# Patient Record
Sex: FEMALE | Race: WHITE | Employment: FULL TIME | URBAN - METROPOLITAN AREA
[De-identification: names, ages, dates, MRNs, and addresses within clinical notes are randomized per-mention and may not be internally consistent; named-entity substitution may affect disease eponyms.]

---

## 2021-12-20 ENCOUNTER — HOSPITAL ENCOUNTER (EMERGENCY)
Age: 51
Discharge: HOME | End: 2021-12-20
Payer: COMMERCIAL

## 2021-12-20 VITALS
WEIGHT: 190 LBS | TEMPERATURE: 98.4 F | HEART RATE: 72 BPM | BODY MASS INDEX: 31.65 KG/M2 | OXYGEN SATURATION: 99 % | HEIGHT: 65 IN | SYSTOLIC BLOOD PRESSURE: 120 MMHG | DIASTOLIC BLOOD PRESSURE: 78 MMHG | RESPIRATION RATE: 18 BRPM

## 2021-12-20 DIAGNOSIS — R11.2 NON-INTRACTABLE VOMITING WITH NAUSEA, UNSPECIFIED VOMITING TYPE: Primary | ICD-10-CM

## 2021-12-20 PROCEDURE — 6370000000 HC RX 637 (ALT 250 FOR IP): Performed by: NURSE PRACTITIONER

## 2021-12-20 PROCEDURE — 99283 EMERGENCY DEPT VISIT LOW MDM: CPT

## 2021-12-20 RX ORDER — LANSOPRAZOLE 15 MG/1
15 CAPSULE, DELAYED RELEASE ORAL DAILY
Qty: 30 CAPSULE | Refills: 3 | Status: ON HOLD | OUTPATIENT
Start: 2021-12-20 | End: 2022-07-21 | Stop reason: ALTCHOICE

## 2021-12-20 RX ORDER — FAMOTIDINE 20 MG/1
20 TABLET, FILM COATED ORAL ONCE
Status: COMPLETED | OUTPATIENT
Start: 2021-12-20 | End: 2021-12-20

## 2021-12-20 RX ADMIN — FAMOTIDINE 20 MG: 20 TABLET ORAL at 17:01

## 2021-12-20 RX ADMIN — Medication: at 17:01

## 2021-12-20 ASSESSMENT — PAIN DESCRIPTION - FREQUENCY: FREQUENCY: CONTINUOUS

## 2021-12-20 ASSESSMENT — PAIN DESCRIPTION - PAIN TYPE: TYPE: ACUTE PAIN

## 2021-12-20 ASSESSMENT — PAIN SCALES - GENERAL: PAINLEVEL_OUTOF10: 4

## 2021-12-20 ASSESSMENT — ENCOUNTER SYMPTOMS
SORE THROAT: 0
RHINORRHEA: 0
SHORTNESS OF BREATH: 0
ABDOMINAL PAIN: 0
COLOR CHANGE: 0

## 2021-12-20 ASSESSMENT — PAIN DESCRIPTION - ONSET: ONSET: GRADUAL

## 2021-12-20 ASSESSMENT — PAIN DESCRIPTION - LOCATION: LOCATION: CHEST

## 2021-12-20 ASSESSMENT — PAIN DESCRIPTION - DESCRIPTORS: DESCRIPTORS: PRESSURE;BURNING

## 2021-12-20 NOTE — ED PROVIDER NOTES
Evaluated by [id number] [facility] Provider          [place] [facility] ED  EMERGENCY DEPARTMENT ENCOUNTER        Pt Name: Carlos Cortez  MRN: [id number]  Johnnygfshadia 1970  Dateof evaluation: 12/20/2021  Provider: PROSPER Gil CNP  PCP: PROSPER Yu CNP  ED Attending: No att. providers found    [address] [facility]       Chief Complaint   Patient presents with    Other     pt states she has food stuck in upper chest. pt states this happens all the time but normally someone can help her get it up. HISTORY OF PRESENTILLNESS   (Location/Symptom, Timing/Onset, Context/Setting, Quality, Duration, Modifying Factors, Severity)  Note limiting factors. Carlos Cortez is a 49 y.o. female for concern for esophageal foreign body. Onset was today. Context includes patient reports that he was eating steak when a piece became lodged. Patient reports that she was not able to drink anything and was brought to the ED. Patient reports that this is happened in the past however the food typically becomes dislodged and passes. She reports that the sensation has not been resolved and she is not been able to drink this time alleviating factors include nothing. Aggravating factors include nothing. Pain is 4/10. Nothing has been used for pain today. Nursing Notes were all reviewed and agreed with or any disagreements were addressed  in the HPI. REVIEW OF SYSTEMS    (2-9 systems for level 4, 10 or more for level 5)     Review of Systems   Constitutional: Negative for fever. HENT: Negative for congestion, rhinorrhea and sore throat. Respiratory: Negative for shortness of breath. Cardiovascular: Negative for chest pain. Gastrointestinal: Negative for abdominal pain. Concern for an esophageal foreign body   Genitourinary: Negative for decreased urine volume and difficulty urinating. Musculoskeletal: Negative for arthralgias and myalgias. Skin: Negative for color change and rash. Neurological: Negative for dizziness and light-headedness. Psychiatric/Behavioral: Negative for agitation. All other systems reviewed and are negative. Positives and Pertinent negatives as per HPI. Except as noted above in the ROS, all other systems were reviewed and negative. PAST MEDICAL HISTORY     Past Medical History:   Diagnosis Date    Diabetes mellitus ([address]     Hypertension          SURGICAL HISTORY     History reviewed. No pertinent surgical history. CURRENT MEDICATIONS       Discharge Medication List as of 12/20/2021  4:58 PM            ALLERGIES     Pcn [penicillins]    FAMILY HISTORY     History reviewed. No pertinent family history. SOCIAL HISTORY       Social History     Socioeconomic History    Marital status:      Spouse name: None    Number of children: None    Years of education: None    Highest education level: None   Occupational History    None   Tobacco Use    Smoking status: Never Smoker    Smokeless tobacco: Never Used   Substance and Sexual Activity    Alcohol use: None    Drug use: Never    Sexual activity: None   Other Topics Concern    None   Social History Narrative    None     Social Determinants of Health     Financial Resource Strain:     Difficulty of Paying Living Expenses: Not on file   Food Insecurity:     Worried About Running Out of Food in the Last Year: Not on file    [place] of Food in the Last Year: Not on file   Transportation Needs:     Lack of Transportation (Medical): Not on file    Lack of Transportation (Non-Medical):  Not on file   Physical Activity:     Days of Exercise per Week: Not on file    Minutes of Exercise per Session: Not on file   Stress:     Feeling of Stress : Not on file   Social Connections:     Frequency of Communication with Friends and Family: Not on file    Frequency of Social Gatherings with Friends and Family: Not on file    Attends Muslim Services: Not on file   [employer] of Clubs or Organizations: Not on file    Attends Club or Organization Meetings: Not on file    Marital Status: Not on file   Intimate Partner Violence:     Fear of Current or Ex-Partner: Not on file    Emotionally Abused: Not on file    Physically Abused: Not on file    Sexually Abused: Not on file   Housing Stability:     Unable to Pay for Housing in the Last Year: Not on file    Number of [place] in the Last Year: Not on file    Unstable Housing in the Last Year: Not on file       SCREENINGS             PHYSICAL EXAM  (up to 7 for level 4, 8 or more for level 5)     ED Triage Vitals [12/20/21 1626]   BP Temp Temp Source Pulse Resp SpO2 Height Weight   127/77 98.4 °F (36.9 °C) Tympanic 71 16 100 % 5' 5\" (1.651 m) 190 lb (86.2 kg)       Physical Exam  Constitutional:       Appearance: She is well-developed. HENT:      Head: Normocephalic and atraumatic. Cardiovascular:      Rate and Rhythm: Normal rate. Pulmonary:      Effort: Pulmonary effort is normal. No respiratory distress. Abdominal:      General: There is no distension. Palpations: Abdomen is soft. Tenderness: There is no abdominal tenderness. Musculoskeletal:         General: Normal range of motion. Cervical back: Normal range of motion. Skin:     General: Skin is warm and dry. Neurological:      Mental Status: She is alert and oriented to person, place, and time. DIAGNOSTIC RESULTS   LABS:    Labs Reviewed - No data to display    All other labs werewithin normal range or not returned as of this dictation. EKG: All EKG's are interpreted by the Emergency Department Physician who either signs or Co-signs this chart in the absence of a cardiologist.  Please see their note for interpretation of EKG. RADIOLOGY:           Interpretation per the Radiologist below, if available at the time of this note:    No orders to display     Patient was never admitted.       PROCEDURES   Unless otherwise noted below, none     Procedures     CRITICAL CARE TIME   N/A    CONSULTS:  None      EMERGENCYDEPARTMENT COURSE and DIFFERENTIAL DIAGNOSIS/MDM:   Vitals:    Vitals:    12/20/21 1626 12/20/21 1704   BP: 127/77 120/78   Pulse: 71 72   Resp: 16 18   Temp: 98.4 °F (36.9 °C)    TempSrc: Tympanic    SpO2: 100% 99%   Weight: 190 lb (86.2 kg)    Height: 5' 5\" (1.651 m)        Patient was given the following medications:  Medications   aluminum & magnesium hydroxide-simethicone (MAALOX) 30 mL, lidocaine viscous hcl (XYLOCAINE) 5 mL (GI COCKTAIL) ( Oral Given 12/20/21 1701)   famotidine (PEPCID) tablet 20 mg (20 mg Oral Given 12/20/21 1701)       Patient was seen and evaluated by myself. Patient here for concerns for esophageal foreign body. Patient reports that she has had this sensation in the past however typically resolves. Patient states that today the symptoms have not resolved. She reports that she is not been able to drink anything. On exam she is awake and alert hemodynamically stable nontoxic in appearance. At the time of my exam the patient states that she thinks her symptoms have improved. She was given a glass of water and was able to drink without any difficulty. At this point the patient will be discharged with instructions to follow-up with a GI doctor. She was encouraged to follow-up with her primary care doctor as well and return to the ED for any worsening symptoms. Patient was provided with GI cocktail and Pepcid. Patient will be discharged with a prescription for Prevacid. She was given GI referral and encouraged to follow-up. Patient was ultimately discharged with all questions answered. The patient tolerated their visit well. I have evaluated this patient. My supervising physician was available for consultation. The patient and / or the family were informed of the results of any tests, a time was given to answer questions, a plan was proposed and they agreed with plan.         FINAL IMPRESSION 1. Non-intractable vomiting with nausea, unspecified vomiting type          DISPOSITION/PLAN   DISPOSITION Decision To Discharge 12/20/2021 05:05:15 PM      PATIENT REFERRED TO:  PROSPER Damon CNP  Layla Pettit  [phone]    Schedule an appointment as soon as possible for a visit in 2 days  for re-evaluation    [facility] ED  [address] [facility]  [phone]    If symptoms worsen    Tim Gordon, [address] [place] [address], [address] [place] [address] [phone]    Schedule an appointment as soon as possible for a visit in 3 days  for re-evaluation      DISCHARGE MEDICATIONS:  Discharge Medication List as of 12/20/2021  4:58 PM      START taking these medications    Details   lansoprazole (PREVACID) 15 MG delayed release capsule Take 1 capsule by mouth daily, Disp-30 capsule, R-3Print             DISCONTINUED MEDICATIONS:  Discharge Medication List as of 12/20/2021  4:58 PM                 (Please note that portions of this note were completed with a voice recognition program.  Efforts were made to edit the dictations but occasionally words are mis-transcribed.)    PROSPER Rhodes CNP (electronically signed)         PROSPER Rhodes CNP  12/20/21 6471

## 2022-07-19 ENCOUNTER — ANESTHESIA EVENT (OUTPATIENT)
Dept: ENDOSCOPY | Age: 52
End: 2022-07-19
Payer: COMMERCIAL

## 2022-07-19 NOTE — ANESTHESIA PRE PROCEDURE
Department of Anesthesiology  Preprocedure Note       Name:  Juan Iglesias   Age:  47 y.o.  :  1970                                          MRN:  [id number]         Date:  2022      Surgeon: Jori Rowan):  Elizabeth Morris MD    Procedure: Procedure(s):  EGD W/ANES. (12:45)    Medications prior to admission:   Prior to Admission medications    Medication Sig Start Date End Date Taking? Authorizing Provider   lansoprazole (PREVACID) 15 MG delayed release capsule Take 1 capsule by mouth daily 21   Shy Wright APRN - CNP       Current medications:    No current facility-administered medications for this encounter. Current Outpatient Medications   Medication Sig Dispense Refill    lansoprazole (PREVACID) 15 MG delayed release capsule Take 1 capsule by mouth daily 30 capsule 3       Allergies: Allergies   Allergen Reactions    Pcn [Penicillins]        Problem List:  There is no problem list on file for this patient. Past Medical History:        Diagnosis Date    Diabetes mellitus ([facility]     Hypertension        Past Surgical History:  No past surgical history on file. Social History:    Social History     Tobacco Use    Smoking status: Never    Smokeless tobacco: Never   Substance Use Topics    Alcohol use: Not on file                                Counseling given: Not Answered      Vital Signs (Current): There were no vitals filed for this visit.                                            BP Readings from Last 3 Encounters:   21 120/78       NPO Status:                                                                                 BMI:   Wt Readings from Last 3 Encounters:   21 190 lb (86.2 kg)     There is no height or weight on file to calculate BMI.    CBC: No results found for: WBC, RBC, HGB, HCT, MCV, RDW, PLT    CMP: No results found for: NA, K, CL, CO2, BUN, CREATININE, GFRAA, AGRATIO, LABGLOM, GLUCOSE, GLU, PROT, CALCIUM, BILITOT, ALKPHOS, AST, ALT    POC Tests: No results for input(s): POCGLU, POCNA, POCK, POCCL, POCBUN, POCHEMO, POCHCT in the last 72 hours. Coags: No results found for: PROTIME, INR, APTT    HCG (If Applicable): No results found for: PREGTESTUR, PREGSERUM, HCG, HCGQUANT     ABGs: No results found for: PHART, PO2ART, NMI7IDA, EEJ5YFB, BEART, O8FSVLCO     Type & Screen (If Applicable):  No results found for: LABABO, LABRH    Drug/Infectious Status (If Applicable):  No results found for: HIV, HEPCAB    COVID-19 Screening (If Applicable): No results found for: COVID19        Anesthesia Evaluation  Patient summary reviewed and Nursing notes reviewed no history of anesthetic complications:   Airway: Mallampati: III     Neck ROM: full     Dental:          Pulmonary:Negative Pulmonary ROS and normal exam    (+) asthma:                            Cardiovascular:Negative CV ROS    (+) hypertension:,                   Neuro/Psych:   Negative Neuro/Psych ROS              GI/Hepatic/Renal: Neg GI/Hepatic/Renal ROS       (-) hiatal hernia and GERD       Endo/Other: Negative Endo/Other ROS   (+) Diabetes, . Abdominal:             Vascular: Other Findings:           Anesthesia Plan      general     ASA 2     (I discussed with the patient the risks and benefits of PIV, general anesthesia, IV Narcotics, PACU. All questions were answered the patient agrees with the plan and wishes to proceed.  )  Induction: intravenous.                             Stevan Hill MD   7/19/2022

## 2022-07-21 ENCOUNTER — HOSPITAL ENCOUNTER (OUTPATIENT)
Age: 52
Setting detail: OUTPATIENT SURGERY
Discharge: HOME | End: 2022-07-21
Attending: INTERNAL MEDICINE | Admitting: INTERNAL MEDICINE
Payer: COMMERCIAL

## 2022-07-21 ENCOUNTER — ANESTHESIA (OUTPATIENT)
Dept: ENDOSCOPY | Age: 52
End: 2022-07-21
Payer: COMMERCIAL

## 2022-07-21 VITALS
RESPIRATION RATE: 16 BRPM | TEMPERATURE: 97.3 F | HEART RATE: 74 BPM | SYSTOLIC BLOOD PRESSURE: 119 MMHG | OXYGEN SATURATION: 98 % | DIASTOLIC BLOOD PRESSURE: 75 MMHG

## 2022-07-21 DIAGNOSIS — R13.19 ESOPHAGEAL DYSPHAGIA: ICD-10-CM

## 2022-07-21 LAB
GLUCOSE BLD-MCNC: 126 MG/DL (ref 70–99)
PERFORMED ON: ABNORMAL
PREGNANCY, URINE: NEGATIVE

## 2022-07-21 PROCEDURE — 88305 TISSUE EXAM BY PATHOLOGIST: CPT

## 2022-07-21 PROCEDURE — 2709999900 HC NON-CHARGEABLE SUPPLY: Performed by: INTERNAL MEDICINE

## 2022-07-21 PROCEDURE — 7100000010 HC PHASE II RECOVERY - FIRST 15 MIN: Performed by: INTERNAL MEDICINE

## 2022-07-21 PROCEDURE — 6360000002 HC RX W HCPCS: Performed by: NURSE ANESTHETIST, CERTIFIED REGISTERED

## 2022-07-21 PROCEDURE — 7100000011 HC PHASE II RECOVERY - ADDTL 15 MIN: Performed by: INTERNAL MEDICINE

## 2022-07-21 PROCEDURE — 3700000000 HC ANESTHESIA ATTENDED CARE: Performed by: INTERNAL MEDICINE

## 2022-07-21 PROCEDURE — 3700000001 HC ADD 15 MINUTES (ANESTHESIA): Performed by: INTERNAL MEDICINE

## 2022-07-21 PROCEDURE — 84703 CHORIONIC GONADOTROPIN ASSAY: CPT

## 2022-07-21 PROCEDURE — 2580000003 HC RX 258: Performed by: NURSE ANESTHETIST, CERTIFIED REGISTERED

## 2022-07-21 PROCEDURE — 3609012400 HC EGD TRANSORAL BIOPSY SINGLE/MULTIPLE: Performed by: INTERNAL MEDICINE

## 2022-07-21 PROCEDURE — 2500000003 HC RX 250 WO HCPCS: Performed by: NURSE ANESTHETIST, CERTIFIED REGISTERED

## 2022-07-21 RX ORDER — OMEPRAZOLE 20 MG/1
20 CAPSULE, DELAYED RELEASE ORAL DAILY
Status: ON HOLD | COMMUNITY
End: 2022-07-21 | Stop reason: SDUPTHER

## 2022-07-21 RX ORDER — ATENOLOL 25 MG/1
25 TABLET ORAL DAILY
COMMUNITY

## 2022-07-21 RX ORDER — ASPIRIN 81 MG/1
81 TABLET ORAL DAILY
COMMUNITY

## 2022-07-21 RX ORDER — SODIUM CHLORIDE, SODIUM LACTATE, POTASSIUM CHLORIDE, CALCIUM CHLORIDE 600; 310; 30; 20 MG/100ML; MG/100ML; MG/100ML; MG/100ML
INJECTION, SOLUTION INTRAVENOUS CONTINUOUS PRN
Status: DISCONTINUED | OUTPATIENT
Start: 2022-07-21 | End: 2022-07-21 | Stop reason: SDUPTHER

## 2022-07-21 RX ORDER — MELOXICAM 15 MG/1
15 TABLET ORAL DAILY
COMMUNITY
End: 2022-07-21

## 2022-07-21 RX ORDER — PROPOFOL 10 MG/ML
INJECTION, EMULSION INTRAVENOUS PRN
Status: DISCONTINUED | OUTPATIENT
Start: 2022-07-21 | End: 2022-07-21 | Stop reason: SDUPTHER

## 2022-07-21 RX ORDER — SODIUM CHLORIDE, SODIUM LACTATE, POTASSIUM CHLORIDE, CALCIUM CHLORIDE 600; 310; 30; 20 MG/100ML; MG/100ML; MG/100ML; MG/100ML
INJECTION, SOLUTION INTRAVENOUS CONTINUOUS
Status: DISCONTINUED | OUTPATIENT
Start: 2022-07-21 | End: 2022-07-21 | Stop reason: HOSPADM

## 2022-07-21 RX ORDER — SIMVASTATIN 20 MG
20 TABLET ORAL NIGHTLY
COMMUNITY
End: 2022-08-17

## 2022-07-21 RX ORDER — LIDOCAINE HYDROCHLORIDE 20 MG/ML
INJECTION, SOLUTION INFILTRATION; PERINEURAL PRN
Status: DISCONTINUED | OUTPATIENT
Start: 2022-07-21 | End: 2022-07-21 | Stop reason: SDUPTHER

## 2022-07-21 RX ORDER — ALBUTEROL SULFATE 0.63 MG/3ML
1 SOLUTION RESPIRATORY (INHALATION) EVERY 6 HOURS PRN
COMMUNITY

## 2022-07-21 RX ORDER — OMEPRAZOLE 20 MG/1
40 CAPSULE, DELAYED RELEASE ORAL DAILY
Qty: 30 CAPSULE | Refills: 3 | Status: SHIPPED | OUTPATIENT
Start: 2022-07-21

## 2022-07-21 RX ADMIN — LIDOCAINE HYDROCHLORIDE 50 MG: 20 INJECTION, SOLUTION INFILTRATION; PERINEURAL at 14:11

## 2022-07-21 RX ADMIN — SODIUM CHLORIDE, POTASSIUM CHLORIDE, SODIUM LACTATE AND CALCIUM CHLORIDE: 600; 310; 30; 20 INJECTION, SOLUTION INTRAVENOUS at 14:03

## 2022-07-21 RX ADMIN — PROPOFOL 100 MG: 10 INJECTION, EMULSION INTRAVENOUS at 14:13

## 2022-07-21 RX ADMIN — PROPOFOL 50 MG: 10 INJECTION, EMULSION INTRAVENOUS at 14:14

## 2022-07-21 RX ADMIN — PROPOFOL 50 MG: 10 INJECTION, EMULSION INTRAVENOUS at 14:19

## 2022-07-21 ASSESSMENT — PAIN - FUNCTIONAL ASSESSMENT: PAIN_FUNCTIONAL_ASSESSMENT: 0-10

## 2022-07-21 NOTE — OP NOTE
[address] [facility] [address], [address], [address] Diane Pettit  Phone: [phone]   ANDREA:[phone]   [address]  [address] [place] [address]  Phone: [phone]   XCI:[phone]    EGD Procedure Note    Patient: Tre Damian  : 1970    Procedure: Esophagogastroduodenoscopy with biopsy    Date:  2022     Endoscopist:  Sindhu Drummond MD    Referring Physician:  PROSPER Medrano - CNP    Preoperative Diagnosis:  Esophageal dysphagia [R13.19]    Postoperative Diagnosis:  * No post-op diagnosis entered *    Anesthesia: Anesthesia: MAC  Sedation: Propofol per anesthesia  Start Time: 1414  Stop Time: 1420  ASA Class: 2  Mallampati: II (soft palate, uvula, fauces visible)    Indications: This is a 47y.o. year old female with medical history of diabetes mellitus type 2, hypertension, history (BMI 31.6) referred for dysphagia of many years duration. Dysphagia is worse with solids especially meats and occasionally with liquids. Associated with heartburn. Heartburn is resolved with trial of OTC omeprazole 20mg daily     Procedure Details  Informed consent was obtained for the procedure, including conscious sedation. Risks of pancreatitis, infection, perforation, hemorrhage, adverse drug reaction and aspiration were discussed. The patient was placed in the left lateral decubitus position. Based on the pre-procedure assessment, including review of the patient's medical history, medications, allergies, and review of systems, she had been deemed to be an appropriate candidate for the above IV sedation; she was therefore sedated with the medications listed above. She was monitored continuously with ECG tracing, pulse oximetry, blood pressure monitoring, and direct observation. A gastroscope was inserted into the mouth and advanced under direct vision to second portion of the duodenum.   A careful inspection was made as the gastroscope was withdrawn, including a retroflexed view of the proximal stomach including views of the incisura and cardia; findings and interventions are described below. Appropriate photodocumentation Was Obtained. If photos taken, they were ordered to be scanned into the medical record. Findings:  Multiple rings in the mid and distal esophagus suggestive of eosinophilic esophagitis. Biopsies taken for pathology. No evidence of esophagitis or stricture. Irregular Z-line at 36 cm from incisors  A few 2-3 mm sessile polyps seen in the fundus suggestive of benign fundic gland polyps. Mild erythematous mucosa in antrum and body of stomach suggestive of mild gastritis. Biopsies taken from antrum, incisura and body of stomach to evaluate for H. Pylori. Normal duodenal bulb and second portion of duodenum. Specimens: Was Obtained:     Complications:   None; patient tolerated the procedure well. Disposition:   PACU - hemodynamically stable. Estimated Blood loss:  none    Impression:   Multiple rings in the mid and distal esophagus suggestive of eosinophilic esophagitis. Biopsied  Irregular Z-line at 36 cm from incisors  A few 2-3 mm sessile polyps seen in the fundus suggestive of benign fundic gland polyps. Mild gastritis. Biopsied  Normal duodenal bulb and second portion of duodenum. Recommendations:  -Continue acid suppression. ,   -Await pathology. ,   -Follow symptoms.  -Schedule for colonoscopy for colon cancer screening         Fady Joy MD   [facility]  7/21/22 2:21 PM EDT    Please note that some or all of this record was generated using voice recognition software. If there are any questions about the content of this document, please contact the author as some errors in translation may have occurred.

## 2022-07-21 NOTE — H&P
: 55-year-old female with medical history of diabetes mellitus type 2, hypertension, history (BMI 31.6) referred for dysphagia of many years duration. Dysphagia is worse with solids especially meats and occasionally with liquids. Associated with heartburn. Heartburn is resolved with trial of OTC omeprazole 20mg daily. Denies abdominal pain, odynophagia, nausea, vomiting, fever, chills, unintentional weight loss, melena or hematochezia. Patient was seen in [facility] ED on 12/20/2021 for esophageal food impaction with clinical resolution in the emergency room. Patient was discharged on Prevacid recommended for outpatient follow-up with GI. Last EGD: none  Last colonoscopy: none    Problem List/Past Medical (Jeanine Ahn; 7/12/2022 9:42 AM)  Gastroesophageal reflux (K21.9)    Dysphagia (R13.10)    Problems Reconciled    Diabetes (E11.9)    Hypertension (I10)      Medication History (Jeanine Ahn; 7/12/2022 9:43 AM)  Omeprazole  (20MG Capsule DR, Oral) Active. Meloxicam  (15MG Tablet, Oral) Active. Atenolol  (25MG Tablet, Oral) Active. Albuterol Sulfate HFA  (108 (90 Base)MCG/ACT Aerosol Soln, Inhalation) Active. metFORMIN HCl ER  (500MG Tablet ER 24HR, Oral) Active. Fenofibrate  (48MG Tablet, Oral) Active. Ibuprofen  (600MG Tablet, Oral) Active. Aspirin Low Dose  (81MG Tablet DR, Oral) Active. Medications Reconciled     Allergies (Jeanine Ahn; 7/12/2022 9:43 AM)  Allergies Reconciled    Penicillins          Review of Systems Malini Menendez MD; 7/12/2022 2:24 PM)  General Not Present- Blood in Urine, Fatigue, Fever, Night Sweats and Painful Urination. Skin Not Present- Itching and Rash. HEENT Not Present- Bleeding Gums, Hearing Loss, Hoarseness, Nasal discharge, Nose Bleed, Sore Throat and Visual Impairment. Respiratory Not Present- Coughing up blood, Persistent Cough, Shortness of breath and Wheezing. Cardiovascular Not Present- Chest Pain and Rapid or Irregular Heart.   Gastrointestinal Present- Dysphagia. Not Present- Abdominal Mass, Abdominal Pain, Abdominal Swelling, Belching, Black, Tarry Stool, Bloating, Bloating/Excess Gas, Bloody Stool, Change in Bowel Habits, Chronic diarrhea, Constipation, Decreased Appetite, Diarrhea, Difficulty Swallowing, Excessive gas, Food Intolerance, Gas, Gets full quickly at meals, Heartburn, Hematemesis, Hemorrhoids, Hyperdefecation, Hyperphagia, Incontinence of Stool, Indigestion, Jaundice, Laxative Use, Melena, Nausea, Nausea/Vomiting, Pain with Bowel Movement, Painful Swallowing, Rectal Bleeding, Stool has not tested positive for blood in the past 6 months, Stool has tested positive for blood in the past 6 months, Straining at stool, Vomiting, Vomiting Blood and Weight Loss. Musculoskeletal Not Present- Joint Pain, Joint Swelling and Swollen Feet. Neurological Not Present- Dizziness, Frequent Headaches, Memory Loss, Muscle Weakness, Passing Out and Seizures. Psychiatric Not Present- Anxiety, Depression and Nervousness. Vitals (Josefina Skipper; 7/12/2022 9:36 AM)  7/12/2022 9:36 AM  Weight: 195 lb   Height: 65 in   Body Surface Area: 1.96 m²   Body Mass Index: 32.45 kg/m²                Physical Exam Birdie Vazquez MD; 7/12/2022 2:25 PM)  General  Mental Status - Alert. General Appearance - Cooperative. Orientation - Oriented to time, Oriented to place and Oriented to person. Build & Nutrition - Well nourished. Integumentary  General Characteristics  Overall examination of the patient's skin reveals - no rashes and no bruises. Head and Neck  Neck  Global Assessment - full range of motion, supple, No lymphadenopathy. Thyroid - Did not examine. Eye  Sclera/Conjunctiva - Bilateral - No Pale - Bilateral, No Yellow - Bilateral.    ENMT  Mouth and Throat  Oral Cavity/Oropharynx - Tongue - no white patches present. Oral Mucosa - no dryness noted. Chest and Lung Exam  Auscultation  Breath sounds - Normal and Clear.  Adventitious sounds - No Adventitious sounds. Cardiovascular  Auscultation  Murmurs & Other Heart Sounds - Auscultation of the heart reveals - No Murmurs. Abdomen  Inspection  Inspection of the abdomen reveals - No Hernias. Skin - Normal.  Palpation/Percussion  Palpation and Percussion of the abdomen reveal - Soft, Non Tender, No Rebound tenderness, No Rigidity (guarding), No Abnormal dullness to percussion and No hepatosplenomegaly. Auscultation  Auscultation of the abdomen reveals - Bowel sounds normal.    Rectal - Did not examine. Peripheral Vascular  Lower Extremity  Palpation - Tenderness - Bilateral - Non Tender. Edema - Bilateral - No edema - Bilateral.    Neurologic  Mental Status  Affect - appropriate. Speech - Normal.    Neuropsychiatric  The patient's mood and affect are described as  - normal.        Assessment & Plan Marquise Buck MD; 7/12/2022 2:29 PM)    Dysphagia (R13.10)  Impression: Dysphagia mainly to solids and occasionally liquids. Differentials include but not limited to peptic stricture, eosinophilic esophagitis, erosive esophagitis, esophageal dysmotility, oropharyngeal dysphagia versus neoplasm    Continue Omeprazole 20 mg orally daily  Plan for EGD with biopsy and possible dilation  Maintain anti-reflux measures with avoidance of carbonated/acid beverages, fried and fatty foods, peppermint, chocolate, alcohol, coffee, citrus fruits/juices, tomato products. Avoid lying down for 2 to 3 hours after eating and avoid tight fitting clothing. Weight loss helps heartburn/reflux especially in presence of a hiatal hernia.     Current Plans  EGD, with biopsy (22147)  Started Omeprazole 20 MG Oral Tablet Delayed Release, 1 (one) Tablet daily, #30, 30 days starting 07/12/2022, Ref. x2.    Gastroesophageal reflux (K21.9)  Impression: Continue Omeprazole 20 mg orally daily  Plan for EGD with biopsy  Maintain anti-reflux measures with avoidance of carbonated/acid beverages, fried and fatty foods, peppermint, chocolate, alcohol, coffee, citrus fruits/juices, tomato products. Avoid lying down for 2 to 3 hours after eating and avoid tight fitting clothing. Weight loss helps heartburn/reflux especially in presence of a hiatal hernia.

## 2022-07-21 NOTE — DISCHARGE INSTRUCTIONS
PATIENT INSTRUCTIONS  POST-SEDATION    Camilla L Pederson          IMMEDIATELY FOLLOWING PROCEDURE:    Do not drive or operate machinery for the first twenty four hours after surgery. Do not make any important decisions for twenty four hours after surgery or while taking narcotic pain medications or sedatives. You should NOT BE LEFT UNATTENDED OR ALONE. A responsible adult should be with you for the rest of the day of your procedure and also during the night for your protection and safety. You may experience some light headedness. Rest at home with activity as tolerated. You may not need to go to bed, but it is important to rest for the next 24 hours. You should not engage in athletic sports such as basketball, volleyball, jogging, skating, or activities requiring refined motor skills for 24 hours. If you develop intractable nausea and vomiting or a severe headache please notify your doctor immediately. You are not expected to have any fever, but if you feel warm, take your temperature. If you have a fever 101 degrees or higher, call your doctor. If you have had an Endoscopy:   *Eat lightly for your first meal and gradually resume your normal / prescribed diet. DO NOT eat or drink until your gag reflex returns. *If you have a sore throat you may use lozenges, or salt water gargles. ONCE YOU ARE HOME, IF YOU SHOULD HAVE:  Difficulty in breathing, persistent nausea or vomiting, bleeding you feel is excessive, or pain that is unusual, increased abdominal bloating, or any swelling, fever / chills, call your physician. If you cannot contact your physician, but feel that your signs and symptoms need a physician's attention, go to the Emergency Department. FOLLOW-UP:    Please follow up with your PCP as scheduled or needed. Dr. Herman Box office will call you with the biopsy findings. Call Dr. Renuka Wood if there are any GI concerns.  [phone]           Gastritis: Care Instructions  Your Care Instructions     Gastritis is a sore and upset stomach. It happens when something irritates the stomach lining. Many things can cause it. These include an infection such as the flu or something you ate or drank. Medicines or a sore on the lining of the stomach (ulcer) also can cause it. Your belly may bloat and ache. You maybelch, vomit, and feel sick to your stomach. You should be able to relieve the problem by taking medicine. And it may helpto change your diet. If gastritis lasts, your doctor may prescribe medicine. Follow-up care is a key part of your treatment and safety. Be sure to make and go to all appointments, and call your doctor if you are having problems. It's also a good idea to know your test results and keep alist of the medicines you take. How can you care for yourself at home? If your doctor prescribed antibiotics, take them as directed. Do not stop taking them just because you feel better. You need to take the full course of antibiotics. Be safe with medicines. If your doctor prescribed medicine to decrease stomach acid, take it as directed. Call your doctor if you think you are having a problem with your medicine. Do not take any other medicine, including over-the-counter pain relievers, without talking to your doctor first.  If your doctor recommends over-the-counter medicine to reduce stomach acid, such as Pepcid AC (famotidine), Prilosec (omeprazole), or Tagamet HB (cimetidine) follow the directions on the label. Drink plenty of fluids to prevent dehydration. Choose water and other clear liquids. If you have kidney, heart, or liver disease and have to limit fluids, talk with your doctor before you increase the amount of fluids you drink. Avoid foods that make your symptoms worse. These may include chocolate, mint, alcohol, pepper, spicy foods, high-fat foods, or drinks with caffeine in them, such as tea, coffee, alexis, or energy drinks.  If your symptoms are worse after you eat a certain food, you may want to stop eating it to see if your symptoms get better. When should you call for help? Call 911 anytime you think you may need emergency care. For example, call if:    You vomit blood or what looks like coffee grounds. You pass maroon or very bloody stools. Call your doctor now or seek immediate medical care if:    You start breathing fast and have not produced urine in the last 8 hours. You cannot keep fluids down. Watch closely for changes in your health, and be sure to contact your doctor if:    You do not get better as expected. Where can you learn more? Go to https://WeShowpepiceweb.[employer]. org and sign in to your [employer] account. Enter [id number] in the [employer] box to learn more about \"Gastritis: Care Instructions. \"     If you do not have an account, please click on the \"Sign Up Now\" link. Current as of: September 8, 2021               Content Version: 13.3  © 2006-2022 Healthwise, Incorporated. Care instructions adapted under license by [facility] ([facility]). If you have questions about a medical condition or this instruction, always ask your healthcare professional. [place] [address] any warranty or liability for your use of this information.

## 2022-07-21 NOTE — ANESTHESIA POSTPROCEDURE EVALUATION
Department of Anesthesiology  Postprocedure Note    Patient: Lindsay Mendoza  MRN: [id number]  YOB: 1970  Date of evaluation: 7/21/2022      Procedure Summary     Date: 07/21/22 Room / Location: [place] [address] / [facility]    Anesthesia Start: 8684 Anesthesia Stop: 8243    Procedure: EGD BIOPSY Diagnosis:       Esophageal dysphagia      (DYSPHAGIA)    Surgeons: Priscilla Mir MD Responsible Provider: Sonali Rodgers MD    Anesthesia Type: general ASA Status: 2          Anesthesia Type: No value filed. Mallory Phase I: Mallory Score: 10    Mallory Phase II: Mallory Score: 9      Anesthesia Post Evaluation    Comments: Postoperative Anesthesia Note    Name:    Lindsay Mendoza  MRN:      [id number]    Patient Vitals in the past 12 hrs:  07/21/22 1435, BP:119/75, Pulse:74, Resp:16, SpO2:98 %  07/21/22 1425, BP:(!) 96/57, Temp:97.3 °F (36.3 °C), Temp src:Temporal, Pulse:67, Resp:14, SpO2:96 %  07/21/22 1252, BP:(!) 113/57, Temp:98.3 °F (36.8 °C), Temp src:Temporal, Pulse:67, Resp:16, SpO2:98 %     LABS:    CBC  No results found for: WBC, HGB, HCT, PLT  RENAL  No results found for: NA, K, CL, CO2, BUN, CREATININE, GLUCOSE  COAGS  No results found for: PROTIME, INR, APTT    Intake & Output:  @61PPSV@    Nausea & Vomiting:  No    Level of Consciousness:  Awake    Pain Assessment:  Adequate analgesia    Anesthesia Complications:  No apparent anesthetic complications    SUMMARY      Vital signs stable  OK to discharge from Stage I post anesthesia care.   Care transferred from Anesthesiology department on discharge from perioperative area

## 2022-08-16 ENCOUNTER — ANESTHESIA EVENT (OUTPATIENT)
Dept: ENDOSCOPY | Age: 52
End: 2022-08-16
Payer: COMMERCIAL

## 2022-08-17 RX ORDER — FENOFIBRATE 48 MG/1
48 TABLET, COATED ORAL DAILY
COMMUNITY

## 2022-08-17 NOTE — PROGRESS NOTES
.1.  Do not eat or drink anything after 12 midnight prior to surgery. This includes no water, chewing gum or mints, except for bowel prep complete per MD.  2.  Take the following pills with a small sip of water on the morning of surgery . 3. Aspirin, Ibuprofen, Advil, Naproxen, Vitamin E and other Anti-inflammatory products should be stopped for 5 days before surgery or as directed by your physician. 4.  Check with your doctor regarding stopping Plavix, Coumadin, Lovenox, Fragmin or other blood thinners. 5.  Do not smoke and do not drink alcoholic beverages 24 hours prior to surgery. This includes NA Beer. 6.  You may brush your teeth and gargle the morning of surgery. DO NOT SWALLOW WATER.  7.  You MUST make arrangements for a responsible adult to take you home after your surgery. You will not be allowed to leave alone or drive yourself home. It is strongly suggested someone stay with you the first 24 hours. Your surgery will be cancelled if you do not have a ride home. 8.  A parent/legal guardian must accompany a child scheduled for surgery and plan to stay at the hospital until the child is discharged. Please do not bring other children with you. 9.  Please wear simple, loose fitting clothing to the hospital.  Heloise Mates not bring valuables ( money, credit cards, checkbooks, etc.)  Do not wear any makeup (including no eye makeup) or nail polish on your fingers or toes. 10.  Do not wear any jewelry or piercing on the day of surgery. All body piercing jewelry must be removed. 11.  If you have dentures, they will be removed before going to the OR; we will provide you a container. If you wear contact lenses or glasses, they will be removed; please bring a case for them.   15.  Notify your Surgeon if you develop any illness between now and surgery time; cough, cold, fever, sore throat, nausea, vomiting, etc.  Please notify your surgeon if you experience dizziness, shortness of breath or blurred vision between now and the time of your surgery. To provide excellent care, visitors will be limited to two in a room at any given time. Please no children under the age of 15 in the surgical department.

## 2022-08-18 NOTE — ANESTHESIA PRE PROCEDURE
Department of Anesthesiology  Preprocedure Note       Name:  Petr Yao   Age:  47 y.o.  :  1970                                          MRN:  [id number]         Date:  2022      Surgeon: Tasneem Signs):  Yuri Chaves MD    Procedure: Procedure(s):  COLON W/ANES. (7:30)    Medications prior to admission:   Prior to Admission medications    Medication Sig Start Date End Date Taking? Authorizing Provider   fenofibrate (TRICOR) 48 MG tablet Take 48 mg by mouth daily   Yes Historical Provider, MD   aspirin 81 MG EC tablet Take 81 mg by mouth in the morning. Historical Provider, MD   metFORMIN (GLUCOPHAGE) 500 MG tablet Take 500 mg by mouth daily (with breakfast)    Historical Provider, MD   atenolol (TENORMIN) 25 MG tablet Take 25 mg by mouth in the morning. Historical Provider, MD   albuterol (ACCUNEB) 0.63 MG/3ML nebulizer solution Take 1 ampule by nebulization every 6 hours as needed for Wheezing    Historical Provider, MD   omeprazole (PRILOSEC) 20 MG delayed release capsule Take 2 capsules by mouth in the morning. 22   Yuri Chaves MD   meloxicam (MOBIC) 15 MG tablet Take 15 mg by mouth in the morning.  22  Historical Provider, MD       Current medications:    No current facility-administered medications for this encounter. Current Outpatient Medications   Medication Sig Dispense Refill    fenofibrate (TRICOR) 48 MG tablet Take 48 mg by mouth daily      aspirin 81 MG EC tablet Take 81 mg by mouth in the morning.  metFORMIN (GLUCOPHAGE) 500 MG tablet Take 500 mg by mouth daily (with breakfast)      atenolol (TENORMIN) 25 MG tablet Take 25 mg by mouth in the morning.  albuterol (ACCUNEB) 0.63 MG/3ML nebulizer solution Take 1 ampule by nebulization every 6 hours as needed for Wheezing      omeprazole (PRILOSEC) 20 MG delayed release capsule Take 2 capsules by mouth in the morning. 30 capsule 3       Allergies:     Allergies   Allergen Reactions    Pcn [Penicillins] Problem List:  There is no problem list on file for this patient. Past Medical History:        Diagnosis Date    Arthritis     Asthma     Diabetes mellitus ([address]     Hyperlipidemia     Hypertension        Past Surgical History:        Procedure Laterality Date    TUBAL LIGATION  2008    UPPER GASTROINTESTINAL ENDOSCOPY N/A 7/21/2022    EGD BIOPSY performed by Ursula Augustin MD at [address] History:    Social History     Tobacco Use    Smoking status: Never    Smokeless tobacco: Never   Substance Use Topics    Alcohol use: Not Currently                                Counseling given: Not Answered      Vital Signs (Current):   Vitals:    08/17/22 1038   Weight: 196 lb (88.9 kg)   Height: 5' 5\" (1.651 m)                                              BP Readings from Last 3 Encounters:   07/21/22 119/75   12/20/21 120/78       NPO Status:                                                                                 BMI:   Wt Readings from Last 3 Encounters:   12/20/21 190 lb (86.2 kg)     Body mass index is 32.62 kg/m². CBC: No results found for: WBC, RBC, HGB, HCT, MCV, RDW, PLT    CMP: No results found for: NA, K, CL, CO2, BUN, CREATININE, GFRAA, AGRATIO, LABGLOM, GLUCOSE, GLU, PROT, CALCIUM, BILITOT, ALKPHOS, AST, ALT    POC Tests: No results for input(s): POCGLU, POCNA, POCK, POCCL, POCBUN, POCHEMO, POCHCT in the last 72 hours.     Coags: No results found for: PROTIME, INR, APTT    HCG (If Applicable):   Lab Results   Component Value Date    PREGTESTUR Negative 07/21/2022        ABGs: No results found for: PHART, PO2ART, BFD5EUT, SFF0LRZ, BEART, P9AFXOJV     Type & Screen (If Applicable):  No results found for: LABABO, LABRH    Drug/Infectious Status (If Applicable):  No results found for: HIV, HEPCAB    COVID-19 Screening (If Applicable): No results found for: COVID19        Anesthesia Evaluation  Patient summary reviewed and Nursing notes reviewed no history of anesthetic complications:   Airway: Mallampati: III     Neck ROM: full     Dental:    (+) upper dentures      Pulmonary:Negative Pulmonary ROS and normal exam    (+) asthma:                            Cardiovascular:Negative CV ROS    (+) hypertension:, hyperlipidemia                  Neuro/Psych:   Negative Neuro/Psych ROS              GI/Hepatic/Renal: Neg GI/Hepatic/Renal ROS       (-) hiatal hernia and GERD       Endo/Other: Negative Endo/Other ROS   (+) Diabetes, : arthritis:., .                 Abdominal:             Vascular: Other Findings:           Anesthesia Plan      general     ASA 2     (I discussed with the patient the risks and benefits of PIV, general anesthesia, IV Narcotics, PACU. All questions were answered the patient agrees with the plan and wishes to proceed.  )  Induction: intravenous. Pre-Operative Diagnosis: Special screening for malignant neoplasms, colon [Z12.11]    47 y.o.   BMI:  Body mass index is 32.62 kg/m².      Vitals:    08/17/22 1038 08/19/22 0659   BP:  124/60   Pulse:  66   Resp:  16   Temp:  97 °F (36.1 °C)   TempSrc:  Temporal   SpO2:  97%   Weight: 196 lb (88.9 kg)    Height: 5' 5\" (1.651 m)        Allergies   Allergen Reactions    Pcn [Penicillins]        Social History     Tobacco Use    Smoking status: Never    Smokeless tobacco: Never   Substance Use Topics    Alcohol use: Not Currently       LABS:    CBC  No results found for: WBC, HGB, HCT, PLT  RENAL  No results found for: NA, K, CL, CO2, BUN, CREATININE, GLUCOSE  COAGS  No results found for: PROTIME, INR, APTT      Diego Feliciano MD   8/18/2022

## 2022-08-18 NOTE — H&P
Interval H&P    I have reviewed the history on 7/21/22 and examined the patient. I find no relevant changes. I have reviewed with the patient the colonoscopy procedure. Colon cancer screening    Plan: colonoscopy    Colonoscopy with alternatives, rationale, benefits and risks, not limited to bleeding, infection, perforation, need of surgery, prolong hospital stay and anesthesia side effects were explained. Patient verbalized understanding and agrees to proceed with colonoscopy.

## 2022-08-19 ENCOUNTER — HOSPITAL ENCOUNTER (OUTPATIENT)
Age: 52
Setting detail: OUTPATIENT SURGERY
Discharge: HOME | End: 2022-08-19
Attending: INTERNAL MEDICINE | Admitting: INTERNAL MEDICINE
Payer: COMMERCIAL

## 2022-08-19 ENCOUNTER — ANESTHESIA (OUTPATIENT)
Dept: ENDOSCOPY | Age: 52
End: 2022-08-19
Payer: COMMERCIAL

## 2022-08-19 VITALS
DIASTOLIC BLOOD PRESSURE: 74 MMHG | WEIGHT: 196 LBS | TEMPERATURE: 97.8 F | RESPIRATION RATE: 18 BRPM | HEART RATE: 78 BPM | BODY MASS INDEX: 32.65 KG/M2 | SYSTOLIC BLOOD PRESSURE: 124 MMHG | OXYGEN SATURATION: 98 % | HEIGHT: 65 IN

## 2022-08-19 LAB
GLUCOSE BLD-MCNC: 129 MG/DL (ref 70–99)
PERFORMED ON: ABNORMAL
PREGNANCY, URINE: NEGATIVE

## 2022-08-19 PROCEDURE — 6360000002 HC RX W HCPCS: Performed by: NURSE ANESTHETIST, CERTIFIED REGISTERED

## 2022-08-19 PROCEDURE — 7100000011 HC PHASE II RECOVERY - ADDTL 15 MIN: Performed by: INTERNAL MEDICINE

## 2022-08-19 PROCEDURE — 3700000001 HC ADD 15 MINUTES (ANESTHESIA): Performed by: INTERNAL MEDICINE

## 2022-08-19 PROCEDURE — 3700000000 HC ANESTHESIA ATTENDED CARE: Performed by: INTERNAL MEDICINE

## 2022-08-19 PROCEDURE — 84703 CHORIONIC GONADOTROPIN ASSAY: CPT

## 2022-08-19 PROCEDURE — 2709999900 HC NON-CHARGEABLE SUPPLY: Performed by: INTERNAL MEDICINE

## 2022-08-19 PROCEDURE — 3609027000 HC COLONOSCOPY: Performed by: INTERNAL MEDICINE

## 2022-08-19 PROCEDURE — 2500000003 HC RX 250 WO HCPCS: Performed by: NURSE ANESTHETIST, CERTIFIED REGISTERED

## 2022-08-19 PROCEDURE — 7100000010 HC PHASE II RECOVERY - FIRST 15 MIN: Performed by: INTERNAL MEDICINE

## 2022-08-19 PROCEDURE — 2580000003 HC RX 258: Performed by: ANESTHESIOLOGY

## 2022-08-19 RX ORDER — SODIUM CHLORIDE 9 MG/ML
INJECTION, SOLUTION INTRAVENOUS PRN
Status: DISCONTINUED | OUTPATIENT
Start: 2022-08-19 | End: 2022-08-19 | Stop reason: HOSPADM

## 2022-08-19 RX ORDER — LIDOCAINE HYDROCHLORIDE 20 MG/ML
INJECTION, SOLUTION INFILTRATION; PERINEURAL PRN
Status: DISCONTINUED | OUTPATIENT
Start: 2022-08-19 | End: 2022-08-19 | Stop reason: SDUPTHER

## 2022-08-19 RX ORDER — SODIUM CHLORIDE 0.9 % (FLUSH) 0.9 %
5-40 SYRINGE (ML) INJECTION PRN
Status: DISCONTINUED | OUTPATIENT
Start: 2022-08-19 | End: 2022-08-19 | Stop reason: HOSPADM

## 2022-08-19 RX ORDER — FAMOTIDINE 10 MG/ML
20 INJECTION, SOLUTION INTRAVENOUS ONCE
Status: DISCONTINUED | OUTPATIENT
Start: 2022-08-19 | End: 2022-08-19

## 2022-08-19 RX ORDER — SODIUM CHLORIDE, SODIUM LACTATE, POTASSIUM CHLORIDE, CALCIUM CHLORIDE 600; 310; 30; 20 MG/100ML; MG/100ML; MG/100ML; MG/100ML
INJECTION, SOLUTION INTRAVENOUS CONTINUOUS
Status: DISCONTINUED | OUTPATIENT
Start: 2022-08-19 | End: 2022-08-19 | Stop reason: SDUPTHER

## 2022-08-19 RX ORDER — SODIUM CHLORIDE, SODIUM LACTATE, POTASSIUM CHLORIDE, CALCIUM CHLORIDE 600; 310; 30; 20 MG/100ML; MG/100ML; MG/100ML; MG/100ML
INJECTION, SOLUTION INTRAVENOUS CONTINUOUS
Status: DISCONTINUED | OUTPATIENT
Start: 2022-08-19 | End: 2022-08-19 | Stop reason: HOSPADM

## 2022-08-19 RX ORDER — PROPOFOL 10 MG/ML
INJECTION, EMULSION INTRAVENOUS PRN
Status: DISCONTINUED | OUTPATIENT
Start: 2022-08-19 | End: 2022-08-19 | Stop reason: SDUPTHER

## 2022-08-19 RX ORDER — SODIUM CHLORIDE 0.9 % (FLUSH) 0.9 %
5-40 SYRINGE (ML) INJECTION EVERY 12 HOURS SCHEDULED
Status: DISCONTINUED | OUTPATIENT
Start: 2022-08-19 | End: 2022-08-19 | Stop reason: HOSPADM

## 2022-08-19 RX ADMIN — PROPOFOL 200 MG: 10 INJECTION, EMULSION INTRAVENOUS at 07:43

## 2022-08-19 RX ADMIN — LIDOCAINE HYDROCHLORIDE 80 MG: 20 INJECTION, SOLUTION INFILTRATION; PERINEURAL at 07:43

## 2022-08-19 RX ADMIN — PROPOFOL 110 MG: 10 INJECTION, EMULSION INTRAVENOUS at 07:48

## 2022-08-19 RX ADMIN — SODIUM CHLORIDE, POTASSIUM CHLORIDE, SODIUM LACTATE AND CALCIUM CHLORIDE: 600; 310; 30; 20 INJECTION, SOLUTION INTRAVENOUS at 07:33

## 2022-08-19 ASSESSMENT — PAIN - FUNCTIONAL ASSESSMENT: PAIN_FUNCTIONAL_ASSESSMENT: 0-10

## 2022-08-19 NOTE — PROGRESS NOTES
Discharge  instructions reviewed. Pt and family verbalize understanding with no further questions. VSS. Pt discharged via [facility] to car. Assessment unchanged.

## 2022-08-19 NOTE — ANESTHESIA POSTPROCEDURE EVALUATION
Department of Anesthesiology  Postprocedure Note    Patient: Kat Rivas  MRN: [id number]  [place]: 1970  Date of evaluation: 8/19/2022      Procedure Summary     Date: 08/19/22 Room / Location: [place] [address] / [facility]    Anesthesia Start: 0730 Anesthesia Stop: 2680    Procedure: COLON W/ANES. (7:30) Diagnosis:       Special screening for malignant neoplasms, colon      (SCREENING)    Surgeons: Zeny Sage MD Responsible Provider: Ryan Montero MD    Anesthesia Type: General ASA Status: 2          Anesthesia Type: General    Mallory Phase I: Mallory Score: 10    Mallory Phase II: Mallory Score: 10      Anesthesia Post Evaluation    Comments: Postoperative Anesthesia Note    Name:    Kat Rivas  MRN:      [id number]    Patient Vitals in the past 12 hrs:  08/19/22 0809, BP:124/74, Pulse:78, Resp:18, SpO2:98 %  08/19/22 0804, BP:122/69, Pulse:71, Resp:18, SpO2:97 %  08/19/22 0759, BP:99/64, Temp:97.8 °F (36.6 °C), Temp src:Temporal, Pulse:67, Resp:18, SpO2:96 %  08/19/22 0659, BP:124/60, Temp:97 °F (36.1 °C), Temp src:Temporal, Pulse:66, Resp:16, SpO2:97 %     LABS:    CBC  No results found for: WBC, HGB, HCT, PLT  RENAL  No results found for: NA, K, CL, CO2, BUN, CREATININE, GLUCOSE  COAGS  No results found for: PROTIME, INR, APTT    Intake & Output:  @24HRIO@    Nausea & Vomiting:  No    Level of Consciousness:  Awake    Pain Assessment:  Adequate analgesia    Anesthesia Complications:  No apparent anesthetic complications    SUMMARY      Vital signs stable  OK to discharge from Stage I post anesthesia care.   Care transferred from Anesthesiology department on discharge from perioperative area

## 2022-08-19 NOTE — OP NOTE
[address] [facility],  [facility] Shay Yoder, [address] [facility], [place], operated by [facility]  Phone: [phone]   SBP:[phone]   [address]  [address] [place] [address]  Phone: [phone]   VOB:[phone]      Colonoscopy Procedure Note    Patient: Aparna Perez  : 1970    Procedure: Colonoscopy --screening    Date:  2022     Endoscopist:  Chanel French MD    Referring Physician:  Karron Goodell, APRN - CNP    Preoperative Diagnosis:  Special screening for malignant neoplasms, colon [Z12.11]    Postoperative Diagnosis:  * No post-op diagnosis entered *    Anesthesia: Anesthesia: MAC  Sedation: Propofol per anesthesia  Start Time: 743  Stop Time:   ASA Class: 2  Mallampati: II (soft palate, uvula, fauces visible)    Indications: This is a 47y.o. year old female with medical history of diabetes mellitus type 2, hypertension, history (BMI 31.6) presents for colon cancer screening. Procedure Details  Informed consent was obtained for the procedure, including sedation. Risks of perforation, hemorrhage, adverse drug reaction and aspiration were discussed. The patient was placed in the left lateral decubitus position. Based on the pre-procedure assessment, including review of the patient's medical history, medications, allergies, and review of systems, she had been deemed to be an appropriate candidate for above IV sedation; she was therefore sedated and monitored continuously with ECG tracing, pulse oximetry, blood pressure monitoring, and direct observations. Rectal examination was performed. There were no external hemorrhoids, fissures or skin tags. The colonoscope was inserted into the rectum and advanced under direct vision to the terminal ileum. The right colon was examined twice as this increases polyp detection especially if other right colon polyps, older age, male, or devries syndrome. The quality of the colonic preparation was good.   A careful inspection was made as the colonoscope was withdrawn, including a retroflexed view of the rectum; findings and interventions are described below. Appropriate photodocumentation Was Obtained:  terminal ileum, appendiceal orifice and ileocecal valve. Findings:   A few small sized non bleeding diverticula scattered throughout colon. Normal appearing terminal ileum. Medium sized non bleeding internal hemorrhoids.       - PREP: Golytely  - Overall difficulty: minimal in degree  - Abdominal pressure: no   - Change in position: no  - Anesthesia issues: no    Specimens: Was Not Obtained    Complications:   None; patient tolerated the procedure well. Disposition:   PACU - hemodynamically stable. Estimated Blood loss:  Minimal to none. Withdrawal Time:  6 minutes    Impression:   A few small sized non bleeding diverticula scattered throughout colon. Normal appearing terminal ileum. Medium sized non bleeding internal hemorrhoids. Recommendations:  -Repeat colonoscopy in 10 years. ,   -High fiber diet. ,   -Follow up with primary care physician. Leelee Patel MD   [address]    8/19/22       Please note that some or all of this record was generated using voice recognition software. If there are any questions about the content of this document, please contact the author as some errors in translation may have occurred.

## 2022-08-19 NOTE — DISCHARGE INSTRUCTIONS
PATIENT INSTRUCTIONS  POST-SEDATION    Camilla L Pederson          IMMEDIATELY FOLLOWING PROCEDURE:    Do not drive or operate machinery for the first twenty four hours after surgery. Do not make any important decisions for twenty four hours after surgery or while taking narcotic pain medications or sedatives. You should NOT BE LEFT UNATTENDED OR ALONE. A responsible adult should be with you for the rest of the day of your procedure and also during the night for your protection and safety. You may experience some light headedness. Rest at home with activity as tolerated. You may not need to go to bed, but it is important to rest for the next 24 hours. You should not engage in athletic sports such as basketball, volleyball, jogging, skating, or activities requiring refined motor skills for 24 hours. If you develop intractable nausea and vomiting or a severe headache please notify your doctor immediately. You are not expected to have any fever, but if you feel warm, take your temperature. If you have a fever 101 degrees or higher, call your doctor. If you have had an Endoscopy:   *Eat lightly for your first meal and gradually resume your normal / prescribed diet. DO NOT eat or drink until your gag reflex returns. *If you have had a colonoscopy, do not expect a normal bowel movement for approximately three days due to the cleansing of the large intestine prior to colonoscopy. ONCE YOU ARE HOME, IF YOU SHOULD HAVE:  Difficulty in breathing, persistent nausea or vomiting, bleeding you feel is excessive, or pain that is unusual, increased abdominal bloating, or any swelling, fever / chills, call your physician. If you cannot contact your physician, but feel that your signs and symptoms need a physician's attention, go to the Emergency Department. FOLLOW-UP:    Please follow up with your primary care provider as scheduled or needed. Call Dr. Brandy Burrows if there are any GI concerns. [phone]. Repeat colonoscopy in 10 years. Diverticulosis: Care Instructions  Your Care Instructions  In diverticulosis, pouches called diverticula form in the wall of the large intestine (colon). The pouches do not cause any pain or other symptoms. Most people who have diverticulosis do not know they have it. But the pouches sometimes bleed, and if they become infected, they can cause pain and othersymptoms. When this happens, it is called diverticulitis. Diverticula form when pressure pushes the wall of the colon outward at certainweak points. A diet that is too low in fiber can cause diverticula. Follow-up care is a key part of your treatment and safety. Be sure to make and go to all appointments, and call your doctor if you are having problems. It's also a good idea to know your test results and keep alist of the medicines you take. How can you care for yourself at home? Include fruits, leafy green vegetables, beans, and whole grains in your diet each day. These foods are high in fiber. Take a fiber supplement, such as Citrucel or Metamucil, every day if needed. Read and follow all instructions on the label. Drink plenty of fluids. If you have kidney, heart, or liver disease and have to limit fluids, talk with your doctor before you increase the amount of fluids you drink. Get at least 30 minutes of exercise on most days of the week. Walking is a good choice. You also may want to do other activities, such as running, swimming, cycling, or playing tennis or team sports. Cut out foods that cause gas, pain, or other symptoms. When should you call for help? Call your doctor now or seek immediate medical care if:    You have belly pain. You pass maroon or very bloody stools. You have a fever. You have nausea and vomiting. You have unusual changes in your bowel movements or abdominal swelling. You have burning pain when you urinate. You have abnormal vaginal discharge. You have shoulder pain. You have cramping pain that does not get better when you have a bowel movement or pass gas. You pass gas or stool from your urethra while urinating. Watch closely for changes in your health, and be sure to contact your doctor ifyou have any problems. Where can you learn more? Go to https://chpepicewtrell.[email or web address] org and sign in to your [employer] account. Enter S543 in the [employer] box to learn more about \"Diverticulosis: Care Instructions. \"     If you do not have an account, please click on the \"Sign Up Now\" link. Current as of: September 8, 2021               Content Version: 13.3  © 2006-2022 [employer]. Care instructions adapted under license by [facility] ([facility]). If you have questions about a medical condition or this instruction, always ask your healthcare professional. [place] [address] any warranty or liability for your use of this information. ANESTHESIA DISCHARGE INSTRUCTIONS    You are under the influence of drugs- do not drink alcohol, drive a car, operate machinery(such as power tools, kitchen appliances, etc), sign legal documents, or make any important decisions for 24 hours (or while on pain medications). Children should not ride bikes or [place] or play on gym sets  for 24 hours after surgery. A responsible adult should be with you for 24 hours. Rest at home today- increase activity as tolerated. Progress slowly to a regular diet unless your physician has instructed you otherwise. Drink plenty of water. CALL YOUR DOCTOR IF YOU:  Have moderate to severe nausea or vomiting AND are unable to hold down fluids or prescribed medications. Have bright red bloody drainage from your dressing that won't stop oozing.   Do not get relief with your pain medication    NORMAL (POSSIBLE) SIDE EFFECTS FROM ANESTHESIA:     Confusion, temporary memory loss, delayed reaction times in the first 24 hours  Lightheadedness, dizziness, difficulty focusing, blurred vision  Nausea/vomiting can happen  Shivering, feeling cold, sore throat, cough and muscle aches should stop within 24-48 hours  Trouble urinating - call your surgeon if it has been more than 8 hrs  Bruising or soreness at the IV site - call if it remains red, firm or there is drainage             FEMALES OF CHILDBEARING AGE WHO ARE TAKING BIRTH CONTROL PILLS:  You may have received a medication during your procedure that interferes with the   actions of birth control pills (Bridion or Emend). Use some other kind of birth control in addition to your pills, like a condom, for 1 month after your procedure to prevent unwanted pregnancy. The following instructions are to be followed if you have a known history or diagnosis of sleep apnea: For all sleep apnea patients:  ? Sleep on your side or sitting up in a chair whenever possible, especially the first 24 hours after surgery. ? Use only medicines prescribed by your doctor. ? Do not drink alcohol. ? If you have a dental device to assist you while at rest, use it at all times for the first 24 hours. For patients using CPAP machines:  ? Use your CPAP machine during all periods of sleep as usual.  ? Use your CPAP machine during all periods of daytime rest while on pain medicines. ** Follow up with your primary care doctor for continued care. IF YOU DO NOT TAKE ALL OF YOUR NARCOTIC PAIN MEDICATION, please dispose of them responsibly. There are drop off boxes in the Emergency Departments 24/7 at both [facility] and [facility]. If these locations are not convenient, other options for discarding them can be found at:  [email or web address]    Hospital or office staff may NOT accept any medications to drop off in the cabinet for you.

## (undated) DEVICE — ENDO CARRY-ON PROCEDURE KIT INCLUDES SUCTION TUBING, LUBRICANT, GAUZE, BIOHAZARD STICKER, TRANSPORT PAD AND INTERCEPT BEDSIDE KIT.: Brand: ENDO CARRY-ON PROCEDURE KIT

## (undated) DEVICE — FORCEP BX STD CAP 240CM RAD JAW 4

## (undated) DEVICE — ELECTRODE,RADIOTRANSLUCENT,FOAM,3PK: Brand: MEDLINE

## (undated) DEVICE — YANKAUER,BULB TIP,W/O VENT,RIGID,STERILE: Brand: MEDLINE

## (undated) DEVICE — ENDOSCOPIC KIT 2 12 FT OP4 DE2 GWN SYR

## (undated) DEVICE — CONMED SCOPE SAVER BITE BLOCK, 20X27 MM: Brand: SCOPE SAVER

## (undated) DEVICE — CANNULA,OXY,ADULT,SUPERSOFT,W/7'TUB,SC: Brand: MEDLINE INDUSTRIES, INC.